# Patient Record
Sex: FEMALE | Race: WHITE | NOT HISPANIC OR LATINO | URBAN - NONMETROPOLITAN AREA
[De-identification: names, ages, dates, MRNs, and addresses within clinical notes are randomized per-mention and may not be internally consistent; named-entity substitution may affect disease eponyms.]

---

## 2018-01-04 ENCOUNTER — OPTICAL OFFICE (OUTPATIENT)
Dept: URBAN - NONMETROPOLITAN AREA CLINIC 4 | Facility: CLINIC | Age: 38
Setting detail: OPHTHALMOLOGY
End: 2018-01-04
Payer: COMMERCIAL

## 2018-01-04 ENCOUNTER — DOCTOR'S OFFICE (OUTPATIENT)
Dept: URBAN - NONMETROPOLITAN AREA CLINIC 1 | Facility: CLINIC | Age: 38
Setting detail: OPHTHALMOLOGY
End: 2018-01-04
Payer: COMMERCIAL

## 2018-01-04 ENCOUNTER — RX ONLY (RX ONLY)
Age: 38
End: 2018-01-04

## 2018-01-04 DIAGNOSIS — H52.13: ICD-10-CM

## 2018-01-04 DIAGNOSIS — H43.813: ICD-10-CM

## 2018-01-04 DIAGNOSIS — H52.223: ICD-10-CM

## 2018-01-04 PROCEDURE — 92014 COMPRE OPH EXAM EST PT 1/>: CPT | Performed by: OPTOMETRIST

## 2018-01-04 PROCEDURE — V2020 VISION SVCS FRAMES PURCHASES: HCPCS | Performed by: OPTOMETRIST

## 2018-01-04 PROCEDURE — V2750 ANTI-REFLECTIVE COATING: HCPCS | Performed by: OPTOMETRIST

## 2018-01-04 PROCEDURE — V2025 EYEGLASSES DELUX FRAMES: HCPCS | Performed by: OPTOMETRIST

## 2018-01-04 PROCEDURE — 92310 CONTACT LENS FITTING OU: CPT | Performed by: OPTOMETRIST

## 2018-01-04 ASSESSMENT — REFRACTION_MANIFEST
OU_VA: 20/
OS_VA1: 20/
OS_VA3: 20/
OS_VA2: 20/
OD_VA1: 20/
OS_VA1: 20/
OD_VA2: 20/
OD_VA3: 20/
OU_VA: 20/
OD_VA2: 20/
OS_VA3: 20/
OD_VA3: 20/
OD_VA1: 20/
OS_VA2: 20/

## 2018-01-04 ASSESSMENT — REFRACTION_OUTSIDERX
OD_CYLINDER: -1.75
OD_SPHERE: -2.25
OD_VA2: 20/20
OU_VA: 20/20
OD_AXIS: 178
OD_VA3: 20/
OS_VA2: 20/20
OS_SPHERE: -2.25
OS_AXIS: 004
OS_VA3: 20/
OS_VA1: 20/20
OD_VA1: 20/20
OS_CYLINDER: -1.50

## 2018-01-04 ASSESSMENT — REFRACTION_CURRENTRX
OD_SPHERE: -2.25
OD_AXIS: 176
OS_OVR_VA: 20/
OD_OVR_VA: 20/
OD_OVR_VA: 20/
OD_VPRISM_DIRECTION: SV
OS_VPRISM_DIRECTION: SV
OS_SPHERE: -2.25
OD_CYLINDER: -1.75
OS_CYLINDER: -1.25
OS_AXIS: 1
OS_OVR_VA: 20/
OS_OVR_VA: 20/
OD_OVR_VA: 20/

## 2018-01-04 ASSESSMENT — REFRACTION_AUTOREFRACTION
OS_SPHERE: -1.75
OD_AXIS: 177
OD_CYLINDER: -2.50
OS_CYLINDER: -2.50
OD_SPHERE: -1.75
OS_AXIS: 171

## 2018-01-04 ASSESSMENT — VISUAL ACUITY
OD_BCVA: 20/20
OS_BCVA: 20/20

## 2018-01-04 ASSESSMENT — SPHEQUIV_DERIVED
OD_SPHEQUIV: -3
OS_SPHEQUIV: -3

## 2018-01-04 ASSESSMENT — CONFRONTATIONAL VISUAL FIELD TEST (CVF)
OD_FINDINGS: FULL
OS_FINDINGS: FULL

## 2018-04-16 ENCOUNTER — DOCTOR'S OFFICE (OUTPATIENT)
Dept: URBAN - NONMETROPOLITAN AREA CLINIC 1 | Facility: CLINIC | Age: 38
Setting detail: OPHTHALMOLOGY
End: 2018-04-16
Payer: COMMERCIAL

## 2018-04-16 DIAGNOSIS — H43.813: ICD-10-CM

## 2018-04-16 DIAGNOSIS — H52.13: ICD-10-CM

## 2018-04-16 PROCEDURE — CLFUP CONTACT LENS FOLLOW-UP: Performed by: OPTOMETRIST

## 2018-04-16 ASSESSMENT — REFRACTION_MANIFEST
OD_VA2: 20/
OS_VA1: 20/
OD_VA1: 20/
OS_VA2: 20/
OD_VA1: 20/
OD_VA3: 20/
OS_VA1: 20/
OS_VA3: 20/
OS_VA3: 20/
OU_VA: 20/
OS_VA2: 20/
OU_VA: 20/
OD_VA3: 20/
OD_VA2: 20/

## 2018-04-16 ASSESSMENT — REFRACTION_OUTSIDERX
OD_SPHERE: -2.25
OS_SPHERE: -2.25
OD_AXIS: 178
OD_CYLINDER: -1.75
OD_VA3: 20/
OS_AXIS: 004
OS_CYLINDER: -1.50
OD_VA2: 20/20
OD_VA1: 20/20
OS_VA2: 20/20
OU_VA: 20/20
OS_VA1: 20/20
OS_VA3: 20/

## 2018-04-16 ASSESSMENT — VISUAL ACUITY
OD_BCVA: 20/25
OS_BCVA: 20/20-2

## 2018-04-16 ASSESSMENT — REFRACTION_AUTOREFRACTION
OD_AXIS: 174
OS_CYLINDER: -2.50
OD_SPHERE: -2.00
OS_SPHERE: -1.75
OS_AXIS: 173
OD_CYLINDER: -2.25

## 2018-04-16 ASSESSMENT — REFRACTION_CURRENTRX
OS_OVR_VA: 20/
OD_OVR_VA: 20/
OD_OVR_VA: 20/
OD_AXIS: 176
OD_OVR_VA: 20/
OS_OVR_VA: 20/
OS_AXIS: 1
OS_CYLINDER: -1.25
OD_SPHERE: -2.25
OD_CYLINDER: -1.75
OS_OVR_VA: 20/
OS_VPRISM_DIRECTION: SV
OS_SPHERE: -2.25
OD_VPRISM_DIRECTION: SV

## 2018-04-16 ASSESSMENT — SPHEQUIV_DERIVED
OS_SPHEQUIV: -3
OD_SPHEQUIV: -3.125

## 2018-04-16 ASSESSMENT — CONFRONTATIONAL VISUAL FIELD TEST (CVF)
OS_FINDINGS: FULL
OD_FINDINGS: FULL

## 2020-08-18 ENCOUNTER — OPTICAL OFFICE (OUTPATIENT)
Dept: URBAN - NONMETROPOLITAN AREA CLINIC 4 | Facility: CLINIC | Age: 40
Setting detail: OPHTHALMOLOGY
End: 2020-08-18
Payer: COMMERCIAL

## 2020-08-18 ENCOUNTER — DOCTOR'S OFFICE (OUTPATIENT)
Dept: URBAN - NONMETROPOLITAN AREA CLINIC 1 | Facility: CLINIC | Age: 40
Setting detail: OPHTHALMOLOGY
End: 2020-08-18

## 2020-08-18 ENCOUNTER — DOCTOR'S OFFICE (OUTPATIENT)
Dept: URBAN - NONMETROPOLITAN AREA CLINIC 1 | Facility: CLINIC | Age: 40
Setting detail: OPHTHALMOLOGY
End: 2020-08-18
Payer: COMMERCIAL

## 2020-08-18 VITALS — HEIGHT: 55 IN

## 2020-08-18 DIAGNOSIS — H52.13: ICD-10-CM

## 2020-08-18 DIAGNOSIS — H52.223: ICD-10-CM

## 2020-08-18 PROCEDURE — V2750 ANTI-REFLECTIVE COATING: HCPCS | Performed by: OPTOMETRIST

## 2020-08-18 PROCEDURE — V2025 EYEGLASSES DELUX FRAMES: HCPCS | Performed by: OPTOMETRIST

## 2020-08-18 PROCEDURE — V2103 SPHEROCYLINDR 4.00D/12-2.00D: HCPCS | Performed by: OPTOMETRIST

## 2020-08-18 PROCEDURE — V2784 LENS POLYCARB OR EQUAL: HCPCS | Performed by: OPTOMETRIST

## 2020-08-18 PROCEDURE — V2020 VISION SVCS FRAMES PURCHASES: HCPCS | Performed by: OPTOMETRIST

## 2020-08-18 PROCEDURE — 92014 COMPRE OPH EXAM EST PT 1/>: CPT | Performed by: OPTOMETRIST

## 2020-08-18 PROCEDURE — 92310 CONTACT LENS FITTING OU: CPT | Performed by: OPTOMETRIST

## 2020-08-18 ASSESSMENT — REFRACTION_AUTOREFRACTION
OS_AXIS: 180
OD_CYLINDER: -1.75
OS_SPHERE: -2.25
OD_AXIS: 1
OD_SPHERE: -2.25
OS_CYLINDER: -1.25

## 2020-08-18 ASSESSMENT — CONFRONTATIONAL VISUAL FIELD TEST (CVF)
OD_FINDINGS: FULL
OS_FINDINGS: FULL

## 2020-08-18 ASSESSMENT — SPHEQUIV_DERIVED
OS_SPHEQUIV: -2.875
OD_SPHEQUIV: -3.125
OD_SPHEQUIV: -3.125
OS_SPHEQUIV: -3

## 2020-08-18 ASSESSMENT — REFRACTION_CURRENTRX
OD_OVR_VA: 20/
OD_VPRISM_DIRECTION: SV
OS_CYLINDER: -1.50
OS_VPRISM_DIRECTION: SV
OD_CYLINDER: -1.75
OS_SPHERE: -2.25
OS_OVR_VA: 20/
OD_AXIS: 2
OD_SPHERE: -2.50
OS_AXIS: 2

## 2020-08-18 ASSESSMENT — REFRACTION_MANIFEST
OS_CYLINDER: -1.50
OD_CYLINDER: -1.75
OD_VA1: 20/20
OU_VA: 20/20
OD_AXIS: 178
OS_VA2: 20/20
OS_VA1: 20/20
OD_VA2: 20/20
OS_SPHERE: -2.25
OD_SPHERE: -2.25
OS_AXIS: 004

## 2020-08-18 ASSESSMENT — VISUAL ACUITY
OD_BCVA: 20/25+2
OS_BCVA: 20/20-2

## 2021-07-30 ENCOUNTER — OPTICAL OFFICE (OUTPATIENT)
Dept: URBAN - NONMETROPOLITAN AREA CLINIC 4 | Facility: CLINIC | Age: 41
Setting detail: OPHTHALMOLOGY
End: 2021-07-30

## 2021-07-30 DIAGNOSIS — H52.223: ICD-10-CM

## 2021-07-30 PROCEDURE — V2762 POLARIZATION, ANY LENS: HCPCS | Performed by: OPTOMETRIST

## 2021-07-30 PROCEDURE — V2020 VISION SVCS FRAMES PURCHASES: HCPCS | Performed by: OPTOMETRIST

## 2021-07-30 PROCEDURE — V2103 SPHEROCYLINDR 4.00D/12-2.00D: HCPCS | Performed by: OPTOMETRIST

## 2021-07-30 PROCEDURE — V2784 LENS POLYCARB OR EQUAL: HCPCS | Performed by: OPTOMETRIST

## 2021-07-30 PROCEDURE — V2750 ANTI-REFLECTIVE COATING: HCPCS | Performed by: OPTOMETRIST

## 2021-12-17 ENCOUNTER — DOCTOR'S OFFICE (OUTPATIENT)
Dept: URBAN - NONMETROPOLITAN AREA CLINIC 1 | Facility: CLINIC | Age: 41
Setting detail: OPHTHALMOLOGY
End: 2021-12-17
Payer: COMMERCIAL

## 2021-12-17 DIAGNOSIS — H16.002: ICD-10-CM

## 2021-12-17 PROCEDURE — 92012 INTRM OPH EXAM EST PATIENT: CPT | Performed by: OPTOMETRIST

## 2021-12-17 ASSESSMENT — REFRACTION_MANIFEST
OS_VA2: 20/20
OS_CYLINDER: -1.50
OU_VA: 20/20
OD_CYLINDER: -1.75
OS_VA1: 20/20
OS_SPHERE: -2.25
OD_VA1: 20/20
OD_AXIS: 178
OD_SPHERE: -2.25
OD_VA2: 20/20
OS_AXIS: 004

## 2021-12-17 ASSESSMENT — REFRACTION_AUTOREFRACTION
OS_AXIS: 180
OS_CYLINDER: -1.25
OD_CYLINDER: -1.75
OD_AXIS: 1
OD_SPHERE: -2.25
OS_SPHERE: -2.25

## 2021-12-17 ASSESSMENT — CONFRONTATIONAL VISUAL FIELD TEST (CVF)
OD_FINDINGS: FULL
OS_FINDINGS: FULL

## 2021-12-17 ASSESSMENT — REFRACTION_CURRENTRX
OS_VPRISM_DIRECTION: SV
OD_AXIS: 2
OS_AXIS: 2
OS_SPHERE: -2.25
OS_OVR_VA: 20/
OS_CYLINDER: -1.50
OD_CYLINDER: -1.75
OD_SPHERE: -2.50
OD_VPRISM_DIRECTION: SV
OD_OVR_VA: 20/

## 2021-12-17 ASSESSMENT — TONOMETRY
OD_IOP_MMHG: 15
OS_IOP_MMHG: 16

## 2021-12-17 ASSESSMENT — SPHEQUIV_DERIVED
OS_SPHEQUIV: -2.875
OS_SPHEQUIV: -3
OD_SPHEQUIV: -3.125
OD_SPHEQUIV: -3.125

## 2021-12-17 ASSESSMENT — VISUAL ACUITY
OD_BCVA: 20/25+2
OS_BCVA: 20/20

## 2021-12-21 ENCOUNTER — DOCTOR'S OFFICE (OUTPATIENT)
Dept: URBAN - NONMETROPOLITAN AREA CLINIC 1 | Facility: CLINIC | Age: 41
Setting detail: OPHTHALMOLOGY
End: 2021-12-21
Payer: COMMERCIAL

## 2021-12-21 VITALS — HEIGHT: 55 IN

## 2021-12-21 DIAGNOSIS — H16.002: ICD-10-CM

## 2021-12-21 PROCEDURE — 92012 INTRM OPH EXAM EST PATIENT: CPT | Performed by: OPTOMETRIST

## 2021-12-21 ASSESSMENT — REFRACTION_MANIFEST
OS_AXIS: 004
OS_VA1: 20/20
OS_SPHERE: -2.25
OD_SPHERE: -2.25
OD_VA2: 20/20
OD_CYLINDER: -1.75
OD_AXIS: 178
OS_CYLINDER: -1.50
OD_VA1: 20/20
OS_VA2: 20/20
OU_VA: 20/20

## 2021-12-21 ASSESSMENT — REFRACTION_CURRENTRX
OS_VPRISM_DIRECTION: SV
OD_SPHERE: -2.50
OS_CYLINDER: -1.50
OD_VPRISM_DIRECTION: SV
OD_OVR_VA: 20/
OS_AXIS: 2
OS_OVR_VA: 20/
OD_CYLINDER: -1.75
OS_SPHERE: -2.25
OD_AXIS: 2

## 2021-12-21 ASSESSMENT — SPHEQUIV_DERIVED
OS_SPHEQUIV: -3
OD_SPHEQUIV: -2.875
OD_SPHEQUIV: -3.125
OS_SPHEQUIV: -2.5

## 2021-12-21 ASSESSMENT — REFRACTION_AUTOREFRACTION
OD_AXIS: 174
OS_CYLINDER: -1.00
OS_AXIS: 175
OD_CYLINDER: -1.25
OD_SPHERE: -2.25
OS_SPHERE: -2.00

## 2021-12-21 ASSESSMENT — VISUAL ACUITY
OS_BCVA: 20/20
OD_BCVA: 20/20-1

## 2021-12-21 ASSESSMENT — TONOMETRY: OS_IOP_MMHG: 16

## 2021-12-21 ASSESSMENT — CONFRONTATIONAL VISUAL FIELD TEST (CVF)
OD_FINDINGS: FULL
OS_FINDINGS: FULL

## 2021-12-28 ENCOUNTER — RX ONLY (RX ONLY)
Age: 41
End: 2021-12-28

## 2021-12-28 ENCOUNTER — DOCTOR'S OFFICE (OUTPATIENT)
Dept: URBAN - NONMETROPOLITAN AREA CLINIC 1 | Facility: CLINIC | Age: 41
Setting detail: OPHTHALMOLOGY
End: 2021-12-28
Payer: COMMERCIAL

## 2021-12-28 DIAGNOSIS — H16.002: ICD-10-CM

## 2021-12-28 PROCEDURE — 99213 OFFICE O/P EST LOW 20 MIN: CPT | Performed by: OPHTHALMOLOGY

## 2021-12-28 ASSESSMENT — REFRACTION_CURRENTRX
OS_VPRISM_DIRECTION: SV
OS_SPHERE: -2.25
OD_OVR_VA: 20/
OD_AXIS: 2
OD_CYLINDER: -1.75
OS_CYLINDER: -1.50
OS_AXIS: 2
OS_OVR_VA: 20/
OD_SPHERE: -2.50
OD_VPRISM_DIRECTION: SV

## 2021-12-28 ASSESSMENT — REFRACTION_MANIFEST
OU_VA: 20/20
OS_SPHERE: -2.25
OS_VA2: 20/20
OD_VA2: 20/20
OS_VA1: 20/20
OD_AXIS: 178
OD_CYLINDER: -1.75
OD_SPHERE: -2.25
OS_AXIS: 004
OS_CYLINDER: -1.50
OD_VA1: 20/20

## 2021-12-28 ASSESSMENT — VISUAL ACUITY
OS_BCVA: 20/20-2
OD_BCVA: 20/20-2

## 2021-12-28 ASSESSMENT — REFRACTION_AUTOREFRACTION
OD_SPHERE: -2.25
OD_AXIS: 174
OS_SPHERE: -2.00
OS_CYLINDER: -1.00
OD_CYLINDER: -1.25
OS_AXIS: 175

## 2021-12-28 ASSESSMENT — CONFRONTATIONAL VISUAL FIELD TEST (CVF)
OD_FINDINGS: FULL
OS_FINDINGS: FULL

## 2021-12-28 ASSESSMENT — SPHEQUIV_DERIVED
OS_SPHEQUIV: -2.5
OD_SPHEQUIV: -3.125
OD_SPHEQUIV: -2.875
OS_SPHEQUIV: -3

## 2022-08-19 ENCOUNTER — DOCTOR'S OFFICE (OUTPATIENT)
Dept: URBAN - NONMETROPOLITAN AREA CLINIC 1 | Facility: CLINIC | Age: 42
Setting detail: OPHTHALMOLOGY
End: 2022-08-19
Payer: COMMERCIAL

## 2022-08-19 ENCOUNTER — OPTICAL OFFICE (OUTPATIENT)
Dept: URBAN - NONMETROPOLITAN AREA CLINIC 4 | Facility: CLINIC | Age: 42
Setting detail: OPHTHALMOLOGY
End: 2022-08-19
Payer: COMMERCIAL

## 2022-08-19 DIAGNOSIS — H52.13: ICD-10-CM

## 2022-08-19 PROBLEM — H16.002 CORNEAL ULCER; LEFT EYE: Status: RESOLVED | Noted: 2021-12-17 | Resolved: 2022-08-19

## 2022-08-19 PROCEDURE — V2025 EYEGLASSES DELUX FRAMES: HCPCS | Performed by: OPTOMETRIST

## 2022-08-19 PROCEDURE — V2783 LENS, >= 1.66 P/>=1.80 G: HCPCS | Performed by: OPTOMETRIST

## 2022-08-19 PROCEDURE — V2750 ANTI-REFLECTIVE COATING: HCPCS | Performed by: OPTOMETRIST

## 2022-08-19 PROCEDURE — 92014 COMPRE OPH EXAM EST PT 1/>: CPT | Performed by: OPTOMETRIST

## 2022-08-19 PROCEDURE — V2020 VISION SVCS FRAMES PURCHASES: HCPCS | Performed by: OPTOMETRIST

## 2022-08-19 PROCEDURE — V2103 SPHEROCYLINDR 4.00D/12-2.00D: HCPCS | Performed by: OPTOMETRIST

## 2022-08-19 PROCEDURE — 92310 CONTACT LENS FITTING OU: CPT | Performed by: OPTOMETRIST

## 2022-08-19 ASSESSMENT — CONFRONTATIONAL VISUAL FIELD TEST (CVF)
OD_FINDINGS: FULL
OS_FINDINGS: FULL

## 2022-08-19 ASSESSMENT — REFRACTION_AUTOREFRACTION
OS_CYLINDER: -1.50
OD_AXIS: 173
OS_SPHERE: -2.25
OD_SPHERE: -2.75
OS_AXIS: 168
OD_CYLINDER: -1.50

## 2022-08-19 ASSESSMENT — REFRACTION_CURRENTRX
OD_CYLINDER: -1.75
OD_VPRISM_DIRECTION: SV
OS_AXIS: 2
OD_OVR_VA: 20/
OS_CYLINDER: -1.50
OD_AXIS: 2
OS_VPRISM_DIRECTION: SV
OS_SPHERE: -2.25
OD_SPHERE: -2.50
OS_OVR_VA: 20/

## 2022-08-19 ASSESSMENT — SPHEQUIV_DERIVED
OS_SPHEQUIV: -3
OD_SPHEQUIV: -3.5
OD_SPHEQUIV: -3.125
OS_SPHEQUIV: -3

## 2022-08-19 ASSESSMENT — REFRACTION_MANIFEST
OD_VA2: 20/20
OS_CYLINDER: -1.50
OS_VA1: 20/20
OU_VA: 20/20
OS_AXIS: 004
OD_VA1: 20/20
OD_SPHERE: -2.25
OS_SPHERE: -2.25
OS_VA2: 20/20
OD_AXIS: 178
OD_CYLINDER: -1.75

## 2022-08-19 ASSESSMENT — DRY EYES - PHYSICIAN NOTES
OS_GENERALCOMMENTS: SCATTERED SPK
OD_GENERALCOMMENTS: SCATTERED SPK

## 2022-08-19 ASSESSMENT — TONOMETRY
OS_IOP_MMHG: 12
OD_IOP_MMHG: 12

## 2022-08-19 ASSESSMENT — VISUAL ACUITY
OS_BCVA: 20/20-1
OD_BCVA: 20/25-1

## 2023-05-09 ENCOUNTER — DOCTOR'S OFFICE (OUTPATIENT)
Dept: URBAN - NONMETROPOLITAN AREA CLINIC 1 | Facility: CLINIC | Age: 43
Setting detail: OPHTHALMOLOGY
End: 2023-05-09
Payer: COMMERCIAL

## 2023-05-09 DIAGNOSIS — H04.123: ICD-10-CM

## 2023-05-09 DIAGNOSIS — H16.002: ICD-10-CM

## 2023-05-09 PROCEDURE — 92012 INTRM OPH EXAM EST PATIENT: CPT | Performed by: OPTOMETRIST

## 2023-05-09 ASSESSMENT — REFRACTION_MANIFEST
OS_AXIS: 004
OS_VA1: 20/20
OD_SPHERE: -2.25
OD_VA1: 20/20
OD_VA2: 20/20
OD_AXIS: 178
OS_VA2: 20/20
OU_VA: 20/20
OS_CYLINDER: -1.50
OD_CYLINDER: -1.75
OS_SPHERE: -2.25

## 2023-05-09 ASSESSMENT — SPHEQUIV_DERIVED
OD_SPHEQUIV: -3.25
OD_SPHEQUIV: -3.125
OS_SPHEQUIV: -3
OS_SPHEQUIV: -3.375

## 2023-05-09 ASSESSMENT — TONOMETRY
OD_IOP_MMHG: 15
OS_IOP_MMHG: 15

## 2023-05-09 ASSESSMENT — REFRACTION_AUTOREFRACTION
OS_SPHERE: -2.50
OD_AXIS: 002
OS_CYLINDER: -1.75
OD_CYLINDER: -2.00
OD_SPHERE: -2.25
OS_AXIS: 172

## 2023-05-09 ASSESSMENT — REFRACTION_CURRENTRX
OS_OVR_VA: 20/
OD_SPHERE: -2.50
OS_VPRISM_DIRECTION: SV
OD_CYLINDER: -1.75
OD_OVR_VA: 20/
OS_AXIS: 2
OS_CYLINDER: -1.50
OS_SPHERE: -2.25
OD_AXIS: 2
OD_VPRISM_DIRECTION: SV

## 2023-05-09 ASSESSMENT — VISUAL ACUITY
OD_BCVA: 20/20
OS_BCVA: 20/25

## 2023-05-09 ASSESSMENT — DRY EYES - PHYSICIAN NOTES
OD_GENERALCOMMENTS: SCATTERED SPK
OS_GENERALCOMMENTS: SCATTERED SPK

## 2023-05-09 ASSESSMENT — CONFRONTATIONAL VISUAL FIELD TEST (CVF)
OD_FINDINGS: FULL
OS_FINDINGS: FULL

## 2023-05-19 ENCOUNTER — DOCTOR'S OFFICE (OUTPATIENT)
Dept: URBAN - NONMETROPOLITAN AREA CLINIC 1 | Facility: CLINIC | Age: 43
Setting detail: OPHTHALMOLOGY
End: 2023-05-19
Payer: COMMERCIAL

## 2023-05-19 DIAGNOSIS — H04.123: ICD-10-CM

## 2023-05-19 DIAGNOSIS — H16.002: ICD-10-CM

## 2023-05-19 PROCEDURE — 92012 INTRM OPH EXAM EST PATIENT: CPT | Performed by: OPTOMETRIST

## 2023-05-19 ASSESSMENT — SPHEQUIV_DERIVED
OD_SPHEQUIV: -3.125
OS_SPHEQUIV: -2.875
OD_SPHEQUIV: -3.125
OS_SPHEQUIV: -3

## 2023-05-19 ASSESSMENT — REFRACTION_CURRENTRX
OS_VPRISM_DIRECTION: SV
OS_OVR_VA: 20/
OD_VPRISM_DIRECTION: SV
OD_CYLINDER: -1.75
OS_AXIS: 178
OD_AXIS: 2
OD_SPHERE: -2.25
OD_OVR_VA: 20/
OS_CYLINDER: -1.50
OS_SPHERE: -2.25

## 2023-05-19 ASSESSMENT — REFRACTION_MANIFEST
OS_VA2: 20/20
OS_VA1: 20/20
OD_CYLINDER: -1.75
OU_VA: 20/20
OD_AXIS: 178
OS_SPHERE: -2.25
OD_VA2: 20/20
OD_VA1: 20/20
OS_CYLINDER: -1.50
OD_SPHERE: -2.25
OS_AXIS: 004

## 2023-05-19 ASSESSMENT — REFRACTION_AUTOREFRACTION
OD_CYLINDER: -1.75
OS_CYLINDER: -1.75
OS_AXIS: 6
OD_SPHERE: -2.25
OS_SPHERE: -2.00
OD_AXIS: 178

## 2023-05-19 ASSESSMENT — DRY EYES - PHYSICIAN NOTES
OD_GENERALCOMMENTS: SCATTERED SPK
OS_GENERALCOMMENTS: SCATTERED SPK

## 2023-05-19 ASSESSMENT — CONFRONTATIONAL VISUAL FIELD TEST (CVF)
OS_FINDINGS: FULL
OD_FINDINGS: FULL

## 2023-05-19 ASSESSMENT — CORNEAL SURGICAL SCARRING: OS_SCARRING: PERIPHERAL STROMAL

## 2023-05-19 ASSESSMENT — VISUAL ACUITY
OS_BCVA: 20/20-2
OD_BCVA: 20/20

## 2023-05-19 ASSESSMENT — TONOMETRY: OS_IOP_MMHG: 15

## 2024-03-03 ENCOUNTER — OFFICE VISIT (OUTPATIENT)
Dept: URGENT CARE | Facility: CLINIC | Age: 44
End: 2024-03-03
Payer: COMMERCIAL

## 2024-03-03 VITALS
OXYGEN SATURATION: 99 % | HEIGHT: 61 IN | SYSTOLIC BLOOD PRESSURE: 98 MMHG | RESPIRATION RATE: 18 BRPM | BODY MASS INDEX: 23.15 KG/M2 | WEIGHT: 122.6 LBS | HEART RATE: 98 BPM | TEMPERATURE: 96 F | DIASTOLIC BLOOD PRESSURE: 72 MMHG

## 2024-03-03 DIAGNOSIS — H10.33 ACUTE BACTERIAL CONJUNCTIVITIS OF BOTH EYES: Primary | ICD-10-CM

## 2024-03-03 PROCEDURE — 99203 OFFICE O/P NEW LOW 30 MIN: CPT | Performed by: PHYSICIAN ASSISTANT

## 2024-03-03 RX ORDER — SEMAGLUTIDE 2.4 MG/.75ML
INJECTION, SOLUTION SUBCUTANEOUS
COMMUNITY

## 2024-03-03 RX ORDER — ESCITALOPRAM OXALATE 20 MG/1
TABLET ORAL
COMMUNITY

## 2024-03-03 RX ORDER — NALTREXONE HYDROCHLORIDE 50 MG/1
25 TABLET, FILM COATED ORAL DAILY
COMMUNITY

## 2024-03-03 RX ORDER — TOBRAMYCIN 3 MG/ML
1 SOLUTION/ DROPS OPHTHALMIC
Qty: 5 ML | Refills: 0 | Status: SHIPPED | OUTPATIENT
Start: 2024-03-03

## 2024-03-03 RX ORDER — CALCIPOTRIENE, BETAMETHASONE DIPROPIONATE 50; .643 UG/G; MG/G
OINTMENT TOPICAL
COMMUNITY
Start: 2024-01-14

## 2024-03-03 RX ORDER — ONABOTULINUMTOXINA 100 [USP'U]/1
INJECTION, POWDER, LYOPHILIZED, FOR SOLUTION INTRADERMAL; INTRAMUSCULAR
COMMUNITY

## 2024-03-03 RX ORDER — LISDEXAMFETAMINE DIMESYLATE CAPSULES 30 MG/1
30 CAPSULE ORAL 2 TIMES DAILY
COMMUNITY

## 2024-03-03 RX ORDER — MODAFINIL 200 MG/1
TABLET ORAL
COMMUNITY
Start: 2024-03-01

## 2024-03-03 RX ORDER — BUPROPION HYDROCHLORIDE 150 MG/1
450 TABLET ORAL DAILY
COMMUNITY

## 2024-03-07 NOTE — PROGRESS NOTES
[facility]        NAME: Dior Waller is a 43 y.o. female  : 1980    MRN: [id number]  DATE: March 3, 2024  TIME: 1:01 PM    Assessment and Plan   Acute bacterial conjunctivitis of both eyes [H10.33]  1. Acute bacterial conjunctivitis of both eyes  tobramycin (TOBREX) 0.3 % SOLN            Patient Instructions     Patient has bilateral conjunctivitis which I will treat with topical antibiotic drops and reviewed precautions with patient regarding pinkeye.  Follow up with PCP in 3-5 days.  Proceed to  ER if symptoms worsen.    If tests have been performed at [facility], our office will contact you with results if changes need to be made to the care plan discussed with you at the visit.  You can review your full results on [facility].    Chief Complaint     Chief Complaint   Patient presents with    Eye Pain     Bilateral itchy eyes with drainage and redness x2 days.            History of Present Illness       Patient presents with bilateral red, itchy, irritated, draining, crusted eyes.  Denies any foreign body or trauma, visual changes or loss, lid swelling, or any other significant symptoms.    Eye Pain   Associated symptoms include an eye discharge, eye redness and itching. Pertinent negatives include no photophobia.       Review of Systems   Review of Systems   Constitutional: Negative.    Eyes:  Positive for discharge, redness and itching. Negative for photophobia, pain and visual disturbance.        Pertinent positives are bilateral   Respiratory: Negative.     Cardiovascular: Negative.    Gastrointestinal: Negative.    Genitourinary: Negative.          Current Medications       Current Outpatient Medications:     buPROPion (WELLBUTRIN XL) 150 mg 24 hr tablet, Take 450 mg by mouth daily, Disp: , Rfl:     calcipotriene-betamethasone (TACLONEX) ointment, APPLY TO AFFECTED NAILS ONCE DAILY, Disp: , Rfl:     Cholecalciferol 50 MCG (2000) CAPS, Take 2,000 Units by mouth, Disp: , Rfl:      "escitalopram (LEXAPRO) 20 mg tablet, TAKE 2 TABLET BY MOUTH EVERY DAY, Disp: , Rfl:     lisdexamfetamine (VYVANSE) 30 MG capsule, Take 30 mg by mouth 2 (two) times a day, Disp: , Rfl:     modafinil (PROVIGIL) 200 MG tablet, , Disp: , Rfl:     naltrexone (REVIA) 50 mg tablet, Take 25 mg by mouth daily, Disp: , Rfl:     onabotulinumtoxin A (Botox) 100 units, Inject as directed. q 3 months, Disp: , Rfl:     tobramycin (TOBREX) 0.3 % SOLN, Administer 1 drop to both eyes every 4 (four) hours while awake, Disp: 5 mL, Rfl: 0    Wegovy 2.4 MG/0.75ML, INJECT 2.4 MG INTO THE SKIN EVERY 7 DAYS., Disp: , Rfl:     Current Allergies     Allergies as of 03/03/2024 - Reviewed 03/03/2024   Allergen Reaction Noted    Sulfa antibiotics Seizures 03/03/2024            The following portions of the patient's history were reviewed and updated as appropriate: allergies, current medications, past family history, past medical history, past social history, past surgical history and problem list.     Past Medical History:   Diagnosis Date    Anxiety     Depression        Past Surgical History:   Procedure Laterality Date    CHOLECYSTECTOMY      REDUCTION MAMMAPLASTY      WISDOM TOOTH EXTRACTION         Family History   Problem Relation Age of Onset    Depression Mother     Prostate cancer Father     Hypertension Father          Medications have been verified.        Objective   BP 98/72   Pulse 98   Temp (!) 96 °F (35.6 °C)   Resp 18   Ht 5' 1\" (1.549 m)   Wt 55.6 kg (122 lb 9.6 oz)   SpO2 99%   BMI 23.17 kg/m²   No LMP recorded.       Physical Exam     Physical Exam  Vitals reviewed.   Constitutional:       General: She is not in acute distress.     Appearance: She is well-developed.   Eyes:      Comments: Bilateral conjunctival injection but no significant active watering or discharge, crusting on lid margins, photophobia, lid edema, foreign body noted on flipping of lids or on examination of eye surfaces.   Neurological:      Mental " Status: She is alert and oriented to person, place, and time.

## 2024-11-12 ENCOUNTER — CLINICAL SUPPORT (OUTPATIENT)
Dept: URGENT CARE | Facility: CLINIC | Age: 44
End: 2024-11-12
Payer: COMMERCIAL

## 2024-11-12 DIAGNOSIS — Z23 ENCOUNTER FOR ADMINISTRATION OF VACCINE: Primary | ICD-10-CM

## 2024-11-12 PROCEDURE — 90656 IIV3 VACC NO PRSV 0.5 ML IM: CPT

## 2024-11-12 PROCEDURE — 96372 THER/PROPH/DIAG INJ SC/IM: CPT

## 2024-11-12 PROCEDURE — 90686 IIV4 VACC NO PRSV 0.5 ML IM: CPT

## 2024-11-12 PROCEDURE — 90471 IMMUNIZATION ADMIN: CPT
